# Patient Record
Sex: FEMALE | Race: WHITE | Employment: UNEMPLOYED
[De-identification: names, ages, dates, MRNs, and addresses within clinical notes are randomized per-mention and may not be internally consistent; named-entity substitution may affect disease eponyms.]

---

## 2021-06-28 ENCOUNTER — NURSE TRIAGE (OUTPATIENT)
Dept: OTHER | Facility: CLINIC | Age: 31
End: 2021-06-28

## 2021-06-28 NOTE — TELEPHONE ENCOUNTER
Reason for Disposition   Patient sounds very upset or troubled to the triager    Answer Assessment - Initial Assessment Questions  1. CONCERN: \"What happened that made you call today? \"      She feels broken and not normal. She was starting to feel motivated while in a relationship and ended things. She stated she felt so complacent with feeling broken, but after this relationship she was more hopeful about the future. But now she is so lost and confused. 2. ANXIETY SYMPTOM SCREENING: \"Can you describe how you have been feeling? \"  (e.g., tense, restless, panicky, anxious, keyed up, trouble sleeping, trouble concentrating)  She is feeling confused and lost. She feels like she was on a roller coaster. She states she is anxious. Her feelings go from hopeful to hopeless in seconds. She states she feels better. She has not been eating and has difficulty concentrating. 3. ONSET: \"How long have you been feeling this way? \"  4 or 5 days. 4. RECURRENT: \"Have you felt this way before? \"  If yes: \"What happened that time? \" \"What helped these feelings go away in the past?\"       Yes, 7 or 8 years ago where she thought about suicide a lot. 5. RISK OF HARM - SUICIDAL IDEATION:  \"Do you ever have thoughts of hurting or killing yourself? \"  (e.g., yes, no, no but preoccupation with thoughts about death)    - INTENT:  \"Do you have thoughts of hurting or killing yourself right NOW? \" (e.g., yes, no, N/A)    - PLAN: \"Do you have a specific plan for how you would do this? \" (e.g., gun, knife, overdose, no plan, N/A)     She states she has not planned and denies intentions right now, but she has been having thoughts about going to sleep. 6. RISK OF HARM - HOMICIDAL IDEATION:  \"Do you ever have thoughts of hurting or killing someone else? \"  (e.g., yes, no, no but preoccupation with thoughts about death)    - INTENT:  \"Do you have thoughts of hurting or killing someone right NOW? \" (e.g., yes, no, N/A)    - PLAN: \"Do you have a specific plan for how you would do this? \" (e.g., gun, knife, no plan, N/A)   Denies. 7. FUNCTIONAL IMPAIRMENT: \"How have things been going for you overall in your life? Have you had any more difficulties than usual doing your normal daily activities? \"  (e.g., better, same, worse; self-care, school, work, interactions)  She states she feels like she has been on a hamster wheel for very a long time. This relationship made her want more than that. She states she feels like she is back on that wheel and she wants more than this for herself. She stated she is having difficulty sleeping, is not eating. She has called off work the other day. She stated she had a few break downs at work, but completed her shift. 8. SUPPORT: \"Who is with you now? \" \"Who do you live with?\" \"Do you have family or friends nearby who you can talk to?\"       She has reached out to her friends and family, but it is not the same. 9. THERAPIST: \"Do you have a counselor or therapist? Name? \"  Denies. 10. STRESSORS: \"Has there been any new stress or recent changes in your life? \"        End of relationship. 11. CAFFEINE ABUSE: \"Do you drink caffeinated beverages, and how much each day? \" (e.g., coffee, tea, ruthie)        Yes, she states she is unsure but knows she drinks a lot of espresso and pops. 12. SUBSTANCE ABUSE: \"Do you use any illegal drugs or alcohol? \"  She states she self medicates and is currently not going through withdrawal. She will occasionally drinks. 13. OTHER SYMPTOMS: \"Do you have any other physical symptoms right now? \" (e.g., chest pain, palpitations, difficulty breathing, fever)  Chest pain, palpitations. They are not present currently. 14. PREGNANCY: \"Is there any chance you are pregnant? \" \"When was your last menstrual period? \"       Doesn't believe, is currently menstruating.     Protocols used: ANXIETY AND PANIC ATTACK-ADULT-[place]    Patient called [facility] with red flag